# Patient Record
Sex: MALE | Race: WHITE | NOT HISPANIC OR LATINO | URBAN - METROPOLITAN AREA
[De-identification: names, ages, dates, MRNs, and addresses within clinical notes are randomized per-mention and may not be internally consistent; named-entity substitution may affect disease eponyms.]

---

## 2017-04-24 ENCOUNTER — APPOINTMENT (OUTPATIENT)
Dept: URBAN - METROPOLITAN AREA CLINIC 219 | Age: 42
Setting detail: DERMATOLOGY
End: 2017-04-30

## 2017-04-24 DIAGNOSIS — D22 MELANOCYTIC NEVI: ICD-10-CM

## 2017-04-24 DIAGNOSIS — B35.1 TINEA UNGUIUM: ICD-10-CM

## 2017-04-24 DIAGNOSIS — L82.1 OTHER SEBORRHEIC KERATOSIS: ICD-10-CM

## 2017-04-24 DIAGNOSIS — B35.3 TINEA PEDIS: ICD-10-CM

## 2017-04-24 DIAGNOSIS — B36.0 PITYRIASIS VERSICOLOR: ICD-10-CM

## 2017-04-24 PROBLEM — D22.5 MELANOCYTIC NEVI OF TRUNK: Status: ACTIVE | Noted: 2017-04-24

## 2017-04-24 PROBLEM — D48.5 NEOPLASM OF UNCERTAIN BEHAVIOR OF SKIN: Status: ACTIVE | Noted: 2017-04-24

## 2017-04-24 PROCEDURE — 99203 OFFICE O/P NEW LOW 30 MIN: CPT | Mod: 25

## 2017-04-24 PROCEDURE — OTHER TREATMENT REGIMEN: OTHER

## 2017-04-24 PROCEDURE — 11301 SHAVE SKIN LESION 0.6-1.0 CM: CPT

## 2017-04-24 PROCEDURE — OTHER COUNSELING: OTHER

## 2017-04-24 PROCEDURE — OTHER PRESCRIPTION: OTHER

## 2017-04-24 PROCEDURE — OTHER SHAVE REMOVAL: OTHER

## 2017-04-24 RX ORDER — CICLOPIROX 7.7 MG/ML
APPLY SUSPENSION TOPICAL TWICE A DAY
Qty: 1 | Refills: 5 | Status: ERX | COMMUNITY
Start: 2017-04-24

## 2017-04-24 ASSESSMENT — LOCATION SIMPLE DESCRIPTION DERM
LOCATION SIMPLE: UPPER BACK
LOCATION SIMPLE: LEFT UPPER BACK
LOCATION SIMPLE: RIGHT SOLE
LOCATION SIMPLE: LEFT GREAT TOE
LOCATION SIMPLE: LEFT SOLE
LOCATION SIMPLE: LEFT LOWER BACK
LOCATION SIMPLE: CHEST
LOCATION SIMPLE: LEFT EAR

## 2017-04-24 ASSESSMENT — LOCATION DETAILED DESCRIPTION DERM
LOCATION DETAILED: INFERIOR THORACIC SPINE
LOCATION DETAILED: LEFT SUPERIOR LATERAL MIDBACK
LOCATION DETAILED: RIGHT SOLE
LOCATION DETAILED: LEFT INFERIOR HELIX
LOCATION DETAILED: LEFT SOLE
LOCATION DETAILED: LEFT SUPERIOR LATERAL UPPER BACK
LOCATION DETAILED: LEFT GREAT TOENAIL
LOCATION DETAILED: LEFT MEDIAL INFERIOR CHEST

## 2017-04-24 ASSESSMENT — LOCATION ZONE DERM
LOCATION ZONE: FEET
LOCATION ZONE: EAR
LOCATION ZONE: TOENAIL
LOCATION ZONE: TRUNK

## 2017-04-24 NOTE — PROCEDURE: SHAVE REMOVAL
Anesthesia Type: 1% lidocaine with epinephrine and a 1:10 solution of 8.4% sodium bicarbonate
Notification Instructions: Patient will be notified of pathology results. However, patient instructed to call the office if not contacted within 2 weeks.
X Size Of Lesion In Cm (Optional): 0
Wound Care: Polysporin ointment
Size Of Lesion In Cm (Required): 0.8
Post-Care Instructions: I reviewed with the patient in detail post-care instructions. Patient is to keep the biopsy site dry overnight, and then apply bacitracin twice daily until healed. Patient may apply hydrogen peroxide soaks to remove any crusting.
Render Post-Care Instructions In Note?: no
Medical Necessity Information: It is in your best interest to select a reason for this procedure from the list below. All of these items fulfill various CMS LCD requirements except the new and changing color options.
Consent was obtained from the patient. The risks and benefits to therapy were discussed in detail. Specifically, the risks of infection, scarring, bleeding, prolonged wound healing, incomplete removal, allergy to anesthesia, nerve injury and recurrence were addressed. Prior to the procedure, the treatment site was clearly identified and confirmed by the patient. All components of Universal Protocol/PAUSE Rule completed.
Anesthesia Volume In Cc: 1
Billing Type: Third-Party Bill
Body Location Override (Optional - Billing Will Still Be Based On Selected Body Map Location If Applicable): Left flank
Hemostasis: Aluminum Chloride
Medical Necessity Clause: This procedure was medically necessary because the lesion that was treated was:
Biopsy Method: Double edge Personna blades
Detail Level: Detailed

## 2017-04-24 NOTE — PROCEDURE: TREATMENT REGIMEN
Detail Level: Zone
Detail Level: Simple
Plan: Ciclopirox TS twice a day as needed\\nZeasorb AF Powder\\nDrying technique
Otc Regimen: Biotin 5mg daily to improve nail growth
Plan: Treatment options discussed\\nPlan Lamisil 250 mg once a day  a day x12 weeks after receive recent blood work from patient's primary MD and LFTs are normal (patient counseled re: risk of liver toxicity, no alcohol while on med)\\nCheck LFTs after 6 weeks of oral Lamisil therapy\\nCiclopirox TS twice a day as needed
Plan: Rotate OTC Nizoral and Selsun Blue shampoo\\nCiclopirox TS twice a day

## 2017-05-09 ENCOUNTER — RX ONLY (RX ONLY)
Age: 42
End: 2017-05-09

## 2017-05-09 RX ORDER — TERBINAFINE HYDROCHLORIDE 250 MG/1
TABLET ORAL ONCE A DAY
Qty: 90 | Refills: 0 | Status: ERX | COMMUNITY
Start: 2017-05-09

## 2017-08-03 ENCOUNTER — APPOINTMENT (OUTPATIENT)
Dept: URBAN - METROPOLITAN AREA CLINIC 219 | Age: 42
Setting detail: DERMATOLOGY
End: 2017-08-03

## 2017-08-03 DIAGNOSIS — D22 MELANOCYTIC NEVI: ICD-10-CM

## 2017-08-03 DIAGNOSIS — B35.3 TINEA PEDIS: ICD-10-CM

## 2017-08-03 DIAGNOSIS — L82.1 OTHER SEBORRHEIC KERATOSIS: ICD-10-CM

## 2017-08-03 DIAGNOSIS — B36.0 PITYRIASIS VERSICOLOR: ICD-10-CM

## 2017-08-03 DIAGNOSIS — B35.1 TINEA UNGUIUM: ICD-10-CM

## 2017-08-03 PROBLEM — D22.5 MELANOCYTIC NEVI OF TRUNK: Status: ACTIVE | Noted: 2017-08-03

## 2017-08-03 PROCEDURE — 99213 OFFICE O/P EST LOW 20 MIN: CPT

## 2017-08-03 PROCEDURE — OTHER COUNSELING: OTHER

## 2017-08-03 PROCEDURE — OTHER TREATMENT REGIMEN: OTHER

## 2017-08-03 PROCEDURE — OTHER REASSURANCE: OTHER

## 2017-08-03 PROCEDURE — OTHER OBSERVATION: OTHER

## 2017-08-03 ASSESSMENT — LOCATION SIMPLE DESCRIPTION DERM
LOCATION SIMPLE: LEFT LOWER BACK
LOCATION SIMPLE: UPPER BACK
LOCATION SIMPLE: RIGHT SOLE
LOCATION SIMPLE: LEFT UPPER BACK
LOCATION SIMPLE: LEFT SOLE
LOCATION SIMPLE: CHEST
LOCATION SIMPLE: LEFT EAR
LOCATION SIMPLE: RIGHT UPPER BACK
LOCATION SIMPLE: LEFT GREAT TOE
LOCATION SIMPLE: LEFT 3RD TOE

## 2017-08-03 ASSESSMENT — LOCATION ZONE DERM
LOCATION ZONE: TOENAIL
LOCATION ZONE: TRUNK
LOCATION ZONE: FEET
LOCATION ZONE: EAR

## 2017-08-03 ASSESSMENT — LOCATION DETAILED DESCRIPTION DERM
LOCATION DETAILED: LEFT INFERIOR HELIX
LOCATION DETAILED: RIGHT SOLE
LOCATION DETAILED: LEFT GREAT TOENAIL
LOCATION DETAILED: LEFT SOLE
LOCATION DETAILED: LEFT SUPERIOR LATERAL MIDBACK
LOCATION DETAILED: LEFT INFERIOR LATERAL UPPER BACK
LOCATION DETAILED: LEFT MEDIAL INFERIOR CHEST
LOCATION DETAILED: INFERIOR THORACIC SPINE
LOCATION DETAILED: RIGHT INFERIOR LATERAL UPPER BACK
LOCATION DETAILED: LEFT 3RD TOENAIL

## 2017-08-03 NOTE — PROCEDURE: OBSERVATION
Body Location Override (Optional - Billing Will Still Be Based On Selected Body Map Location If Applicable): left flank
X Size Of Lesion In Cm (Optional): 0
Detail Level: Detailed

## 2017-08-03 NOTE — PROCEDURE: TREATMENT REGIMEN
Plan: Consider future Diflucan
Detail Level: Zone
Modify Regimen: Add Biotin 5mg daily to improve nail growth
Detail Level: Simple
Continue Regimen: Ciclopirox TS twice a day as needed\\nZeasorb AF Powder\\nDrying technique
Continue Regimen: Ciclopirox TS twice a day as needed\\nComplete 90 day oral lamisil 250 mg (has 10 days left)
Continue Regimen: Rotate OTC Nizoral and Selsun Blue shampoo (increase usage)\\nCiclopirox TS twice a day (increase usage)

## 2017-11-03 ENCOUNTER — APPOINTMENT (OUTPATIENT)
Dept: URBAN - METROPOLITAN AREA CLINIC 219 | Age: 42
Setting detail: DERMATOLOGY
End: 2017-11-03

## 2017-11-03 DIAGNOSIS — B35.3 TINEA PEDIS: ICD-10-CM

## 2017-11-03 DIAGNOSIS — D22 MELANOCYTIC NEVI: ICD-10-CM

## 2017-11-03 DIAGNOSIS — B35.1 TINEA UNGUIUM: ICD-10-CM

## 2017-11-03 DIAGNOSIS — B36.0 PITYRIASIS VERSICOLOR: ICD-10-CM

## 2017-11-03 DIAGNOSIS — L82.1 OTHER SEBORRHEIC KERATOSIS: ICD-10-CM

## 2017-11-03 PROBLEM — D22.5 MELANOCYTIC NEVI OF TRUNK: Status: ACTIVE | Noted: 2017-11-03

## 2017-11-03 PROCEDURE — OTHER MIPS QUALITY: OTHER

## 2017-11-03 PROCEDURE — OTHER REASSURANCE: OTHER

## 2017-11-03 PROCEDURE — OTHER OBSERVATION AND MEASURE: OTHER

## 2017-11-03 PROCEDURE — OTHER TREATMENT REGIMEN: OTHER

## 2017-11-03 PROCEDURE — OTHER OBSERVATION: OTHER

## 2017-11-03 PROCEDURE — 99213 OFFICE O/P EST LOW 20 MIN: CPT

## 2017-11-03 PROCEDURE — OTHER PRESCRIPTION: OTHER

## 2017-11-03 PROCEDURE — OTHER COUNSELING: OTHER

## 2017-11-03 RX ORDER — TAVABOROLE 43.5 MG/ML
APPLY SOLUTION TOPICAL ONCE DAILY
Qty: 1 | Refills: 3 | Status: ERX | COMMUNITY
Start: 2017-11-03

## 2017-11-03 ASSESSMENT — LOCATION SIMPLE DESCRIPTION DERM
LOCATION SIMPLE: RIGHT UPPER BACK
LOCATION SIMPLE: LEFT UPPER BACK
LOCATION SIMPLE: LEFT GREAT TOE
LOCATION SIMPLE: RIGHT SOLE
LOCATION SIMPLE: LEFT SOLE
LOCATION SIMPLE: LEFT 3RD TOE
LOCATION SIMPLE: LEFT EAR
LOCATION SIMPLE: LEFT LOWER BACK
LOCATION SIMPLE: CHEST

## 2017-11-03 ASSESSMENT — LOCATION DETAILED DESCRIPTION DERM
LOCATION DETAILED: LEFT SUPERIOR HELIX
LOCATION DETAILED: LEFT SOLE
LOCATION DETAILED: LEFT GREAT TOENAIL
LOCATION DETAILED: LEFT INFERIOR LATERAL MIDBACK
LOCATION DETAILED: LEFT INFERIOR LATERAL UPPER BACK
LOCATION DETAILED: RIGHT SUPERIOR MEDIAL UPPER BACK
LOCATION DETAILED: RIGHT INFERIOR LATERAL UPPER BACK
LOCATION DETAILED: LEFT MEDIAL SUPERIOR CHEST
LOCATION DETAILED: RIGHT SOLE
LOCATION DETAILED: LEFT 3RD TOENAIL

## 2017-11-03 ASSESSMENT — LOCATION ZONE DERM
LOCATION ZONE: TRUNK
LOCATION ZONE: TOENAIL
LOCATION ZONE: FEET
LOCATION ZONE: EAR

## 2017-11-03 NOTE — PROCEDURE: TREATMENT REGIMEN
Detail Level: Simple
Modify Regimen: Change to Kerydin - apply 1-2 drops to affected nails once a day
Continue Regimen: Biotin 5mg daily to improve nail growth
Continue Regimen: Rotate OTC Nizoral and Selsun Blue shampoo (increase usage)\\nCiclopirox TS twice a day (increase usage)
Detail Level: Zone
Continue Regimen: Ciclopirox TS twice a day as needed\\nZeasorb AF Powder\\nDrying technique

## 2017-12-01 ENCOUNTER — RX ONLY (RX ONLY)
Age: 42
End: 2017-12-01

## 2017-12-01 RX ORDER — CICLOPIROX 80 MG/ML
APPLY SOLUTION TOPICAL QD
Qty: 1 | Refills: 3 | Status: ERX | COMMUNITY
Start: 2017-12-01

## 2018-11-16 ENCOUNTER — APPOINTMENT (OUTPATIENT)
Dept: URBAN - METROPOLITAN AREA CLINIC 219 | Age: 43
Setting detail: DERMATOLOGY
End: 2018-11-16

## 2018-11-16 DIAGNOSIS — D22 MELANOCYTIC NEVI: ICD-10-CM

## 2018-11-16 DIAGNOSIS — B35.1 TINEA UNGUIUM: ICD-10-CM

## 2018-11-16 DIAGNOSIS — L82.1 OTHER SEBORRHEIC KERATOSIS: ICD-10-CM

## 2018-11-16 DIAGNOSIS — B35.3 TINEA PEDIS: ICD-10-CM

## 2018-11-16 DIAGNOSIS — B36.0 PITYRIASIS VERSICOLOR: ICD-10-CM

## 2018-11-16 PROBLEM — D22.5 MELANOCYTIC NEVI OF TRUNK: Status: ACTIVE | Noted: 2018-11-16

## 2018-11-16 PROBLEM — D22.62 MELANOCYTIC NEVI OF LEFT UPPER LIMB, INCLUDING SHOULDER: Status: ACTIVE | Noted: 2018-11-16

## 2018-11-16 PROBLEM — D22.61 MELANOCYTIC NEVI OF RIGHT UPPER LIMB, INCLUDING SHOULDER: Status: ACTIVE | Noted: 2018-11-16

## 2018-11-16 PROCEDURE — OTHER PRESCRIPTION: OTHER

## 2018-11-16 PROCEDURE — OTHER REASSURANCE: OTHER

## 2018-11-16 PROCEDURE — OTHER MIPS QUALITY: OTHER

## 2018-11-16 PROCEDURE — 99214 OFFICE O/P EST MOD 30 MIN: CPT

## 2018-11-16 PROCEDURE — OTHER OBSERVATION: OTHER

## 2018-11-16 PROCEDURE — OTHER OBSERVATION AND MEASURE: OTHER

## 2018-11-16 PROCEDURE — OTHER COUNSELING: OTHER

## 2018-11-16 PROCEDURE — OTHER TREATMENT REGIMEN: OTHER

## 2018-11-16 RX ORDER — KETOCONAZOLE 20.5 MG/ML
SHAMPOO, SUSPENSION TOPICAL
Qty: 1 | Refills: 3 | Status: ERX | COMMUNITY
Start: 2018-11-16

## 2018-11-16 RX ORDER — CICLOPIROX 7.7 MG/ML
APPLY SUSPENSION TOPICAL
Qty: 1 | Refills: 3 | Status: ERX | COMMUNITY
Start: 2018-11-16

## 2018-11-16 ASSESSMENT — LOCATION DETAILED DESCRIPTION DERM
LOCATION DETAILED: EPIGASTRIC SKIN
LOCATION DETAILED: LEFT SOLE
LOCATION DETAILED: RIGHT INFERIOR LATERAL UPPER BACK
LOCATION DETAILED: LEFT 3RD TOENAIL
LOCATION DETAILED: RIGHT ANTERIOR PROXIMAL UPPER ARM
LOCATION DETAILED: LEFT INFERIOR LATERAL MIDBACK
LOCATION DETAILED: LEFT SUPERIOR HELIX
LOCATION DETAILED: LEFT GREAT TOENAIL
LOCATION DETAILED: LEFT INFERIOR LATERAL UPPER BACK
LOCATION DETAILED: RIGHT SOLE
LOCATION DETAILED: LEFT ANTERIOR PROXIMAL UPPER ARM

## 2018-11-16 ASSESSMENT — LOCATION ZONE DERM
LOCATION ZONE: EAR
LOCATION ZONE: TOENAIL
LOCATION ZONE: ARM
LOCATION ZONE: TRUNK
LOCATION ZONE: FEET

## 2018-11-16 ASSESSMENT — LOCATION SIMPLE DESCRIPTION DERM
LOCATION SIMPLE: ABDOMEN
LOCATION SIMPLE: RIGHT UPPER ARM
LOCATION SIMPLE: LEFT UPPER ARM
LOCATION SIMPLE: LEFT 3RD TOE
LOCATION SIMPLE: LEFT EAR
LOCATION SIMPLE: RIGHT SOLE
LOCATION SIMPLE: LEFT UPPER BACK
LOCATION SIMPLE: LEFT SOLE
LOCATION SIMPLE: LEFT LOWER BACK
LOCATION SIMPLE: RIGHT UPPER BACK
LOCATION SIMPLE: LEFT GREAT TOE

## 2018-11-16 NOTE — PROCEDURE: TREATMENT REGIMEN
Plan: Nizoral Shampoo 2% shampoo scalp/body 2-3 times a week until improved and then decrease to once a week \\nCiclopirox TS twice a day as needed

## 2018-11-16 NOTE — PROCEDURE: TREATMENT REGIMEN
Plan: OTC Lamisil Spray twice a day \\nNail clipping sent for fungal culture ([facility]) Plan: OTC Lamisil Spray twice a day \\nNail clipping sent for fungal culture ([facility])

## 2019-05-14 ENCOUNTER — APPOINTMENT (OUTPATIENT)
Dept: URBAN - METROPOLITAN AREA CLINIC 219 | Age: 44
Setting detail: DERMATOLOGY
End: 2019-05-14

## 2019-05-14 DIAGNOSIS — B35.1 TINEA UNGUIUM: ICD-10-CM

## 2019-05-14 DIAGNOSIS — L82.1 OTHER SEBORRHEIC KERATOSIS: ICD-10-CM

## 2019-05-14 DIAGNOSIS — B36.0 PITYRIASIS VERSICOLOR: ICD-10-CM

## 2019-05-14 DIAGNOSIS — B35.3 TINEA PEDIS: ICD-10-CM

## 2019-05-14 DIAGNOSIS — D22 MELANOCYTIC NEVI: ICD-10-CM

## 2019-05-14 PROBLEM — D22.62 MELANOCYTIC NEVI OF LEFT UPPER LIMB, INCLUDING SHOULDER: Status: ACTIVE | Noted: 2019-05-14

## 2019-05-14 PROBLEM — D22.5 MELANOCYTIC NEVI OF TRUNK: Status: ACTIVE | Noted: 2019-05-14

## 2019-05-14 PROBLEM — D22.61 MELANOCYTIC NEVI OF RIGHT UPPER LIMB, INCLUDING SHOULDER: Status: ACTIVE | Noted: 2019-05-14

## 2019-05-14 PROCEDURE — OTHER COUNSELING: OTHER

## 2019-05-14 PROCEDURE — OTHER PRESCRIPTION: OTHER

## 2019-05-14 PROCEDURE — OTHER MIPS QUALITY: OTHER

## 2019-05-14 PROCEDURE — OTHER OBSERVATION AND MEASURE: OTHER

## 2019-05-14 PROCEDURE — 99214 OFFICE O/P EST MOD 30 MIN: CPT

## 2019-05-14 PROCEDURE — OTHER TREATMENT REGIMEN: OTHER

## 2019-05-14 PROCEDURE — OTHER OBSERVATION: OTHER

## 2019-05-14 PROCEDURE — OTHER REASSURANCE: OTHER

## 2019-05-14 RX ORDER — CICLOPIROX 8 %
APPLY SOLUTION, NON-ORAL TOPICAL ONCE A DAY
Qty: 1 | Refills: 5 | Status: ERX | COMMUNITY
Start: 2019-05-14

## 2019-05-14 ASSESSMENT — LOCATION SIMPLE DESCRIPTION DERM
LOCATION SIMPLE: LEFT 3RD TOE
LOCATION SIMPLE: RIGHT UPPER BACK
LOCATION SIMPLE: RIGHT UPPER ARM
LOCATION SIMPLE: ABDOMEN
LOCATION SIMPLE: LEFT UPPER ARM
LOCATION SIMPLE: LEFT GREAT TOE
LOCATION SIMPLE: LEFT UPPER BACK
LOCATION SIMPLE: LEFT LOWER BACK

## 2019-05-14 ASSESSMENT — LOCATION DETAILED DESCRIPTION DERM
LOCATION DETAILED: EPIGASTRIC SKIN
LOCATION DETAILED: LEFT INFERIOR LATERAL UPPER BACK
LOCATION DETAILED: LEFT GREAT TOENAIL
LOCATION DETAILED: LEFT INFERIOR LATERAL MIDBACK
LOCATION DETAILED: LEFT MID-UPPER BACK
LOCATION DETAILED: RIGHT INFERIOR LATERAL UPPER BACK
LOCATION DETAILED: LEFT 3RD TOENAIL
LOCATION DETAILED: LEFT ANTERIOR PROXIMAL UPPER ARM
LOCATION DETAILED: RIGHT ANTERIOR PROXIMAL UPPER ARM

## 2019-05-14 ASSESSMENT — LOCATION ZONE DERM
LOCATION ZONE: TRUNK
LOCATION ZONE: ARM
LOCATION ZONE: TOENAIL

## 2019-05-14 NOTE — PROCEDURE: TREATMENT REGIMEN
Plan: Change to Ciclopirox TS twice a day as needed
Detail Level: Zone
Detail Level: Simple
Plan: Change to Ciclopirox solution 8% once a day, wipe clean with rubbing alcohol once a week\\nAdd Biotin 5 mg (5,000 Mcg) a day - stop 3 days prior to any lab work\\nPatient declines 3rd oral lamisil course at this time
Continue Regimen: Zeasorb AF Powder\\nDrying technique
Plan: Nizoral Shampoo 2% shampoo scalp/body 2-3 once a week, increase to 2-3 times a week as needed for flares \\nCiclopirox TS twice a day as needed

## 2023-06-16 ENCOUNTER — APPOINTMENT (OUTPATIENT)
Dept: URBAN - NONMETROPOLITAN AREA CLINIC 45 | Age: 48
Setting detail: DERMATOLOGY
End: 2023-06-16

## 2023-06-16 DIAGNOSIS — D22 MELANOCYTIC NEVI: ICD-10-CM

## 2023-06-16 DIAGNOSIS — L57.8 OTHER SKIN CHANGES DUE TO CHRONIC EXPOSURE TO NONIONIZING RADIATION: ICD-10-CM

## 2023-06-16 PROBLEM — D22.62 MELANOCYTIC NEVI OF LEFT UPPER LIMB, INCLUDING SHOULDER: Status: ACTIVE | Noted: 2023-06-16

## 2023-06-16 PROBLEM — D22.61 MELANOCYTIC NEVI OF RIGHT UPPER LIMB, INCLUDING SHOULDER: Status: ACTIVE | Noted: 2023-06-16

## 2023-06-16 PROBLEM — D22.5 MELANOCYTIC NEVI OF TRUNK: Status: ACTIVE | Noted: 2023-06-16

## 2023-06-16 PROCEDURE — 99203 OFFICE O/P NEW LOW 30 MIN: CPT

## 2023-06-16 PROCEDURE — OTHER TREATMENT REGIMEN: OTHER

## 2023-06-16 PROCEDURE — OTHER MIPS QUALITY: OTHER

## 2023-06-16 PROCEDURE — OTHER COUNSELING: OTHER

## 2023-06-16 ASSESSMENT — LOCATION SIMPLE DESCRIPTION DERM
LOCATION SIMPLE: LEFT UPPER ARM
LOCATION SIMPLE: LEFT LOWER BACK
LOCATION SIMPLE: RIGHT UPPER ARM
LOCATION SIMPLE: ABDOMEN

## 2023-06-16 ASSESSMENT — LOCATION DETAILED DESCRIPTION DERM
LOCATION DETAILED: LEFT INFERIOR LATERAL MIDBACK
LOCATION DETAILED: LEFT ANTERIOR PROXIMAL UPPER ARM
LOCATION DETAILED: RIGHT ANTERIOR PROXIMAL UPPER ARM
LOCATION DETAILED: EPIGASTRIC SKIN

## 2023-06-16 ASSESSMENT — LOCATION ZONE DERM
LOCATION ZONE: ARM
LOCATION ZONE: TRUNK

## 2023-12-27 ENCOUNTER — APPOINTMENT (OUTPATIENT)
Dept: URBAN - NONMETROPOLITAN AREA CLINIC 45 | Age: 48
Setting detail: DERMATOLOGY
End: 2023-12-28

## 2023-12-27 DIAGNOSIS — D22 MELANOCYTIC NEVI: ICD-10-CM

## 2023-12-27 DIAGNOSIS — L57.8 OTHER SKIN CHANGES DUE TO CHRONIC EXPOSURE TO NONIONIZING RADIATION: ICD-10-CM

## 2023-12-27 DIAGNOSIS — L82.1 OTHER SEBORRHEIC KERATOSIS: ICD-10-CM

## 2023-12-27 PROBLEM — D22.61 MELANOCYTIC NEVI OF RIGHT UPPER LIMB, INCLUDING SHOULDER: Status: ACTIVE | Noted: 2023-12-27

## 2023-12-27 PROBLEM — D22.62 MELANOCYTIC NEVI OF LEFT UPPER LIMB, INCLUDING SHOULDER: Status: ACTIVE | Noted: 2023-12-27

## 2023-12-27 PROBLEM — D22.5 MELANOCYTIC NEVI OF TRUNK: Status: ACTIVE | Noted: 2023-12-27

## 2023-12-27 PROCEDURE — OTHER PHOTO-DOCUMENTATION: OTHER

## 2023-12-27 PROCEDURE — OTHER MIPS QUALITY: OTHER

## 2023-12-27 PROCEDURE — OTHER OBSERVATION: OTHER

## 2023-12-27 PROCEDURE — 99213 OFFICE O/P EST LOW 20 MIN: CPT

## 2023-12-27 PROCEDURE — OTHER TREATMENT REGIMEN: OTHER

## 2023-12-27 PROCEDURE — OTHER COUNSELING: OTHER

## 2023-12-27 ASSESSMENT — LOCATION ZONE DERM
LOCATION ZONE: ARM
LOCATION ZONE: TRUNK

## 2023-12-27 ASSESSMENT — LOCATION DETAILED DESCRIPTION DERM
LOCATION DETAILED: RIGHT SUPERIOR MEDIAL UPPER BACK
LOCATION DETAILED: LEFT ANTERIOR PROXIMAL UPPER ARM
LOCATION DETAILED: EPIGASTRIC SKIN
LOCATION DETAILED: RIGHT ANTERIOR PROXIMAL UPPER ARM
LOCATION DETAILED: LEFT INFERIOR LATERAL MIDBACK
LOCATION DETAILED: SUPERIOR LUMBAR SPINE

## 2023-12-27 ASSESSMENT — LOCATION SIMPLE DESCRIPTION DERM
LOCATION SIMPLE: RIGHT UPPER BACK
LOCATION SIMPLE: LEFT UPPER ARM
LOCATION SIMPLE: RIGHT UPPER ARM
LOCATION SIMPLE: ABDOMEN
LOCATION SIMPLE: LOWER BACK
LOCATION SIMPLE: LEFT LOWER BACK